# Patient Record
Sex: FEMALE | Race: WHITE
[De-identification: names, ages, dates, MRNs, and addresses within clinical notes are randomized per-mention and may not be internally consistent; named-entity substitution may affect disease eponyms.]

---

## 2018-03-14 ENCOUNTER — HOSPITAL ENCOUNTER (EMERGENCY)
Dept: HOSPITAL 80 - CED | Age: 68
Discharge: HOME | End: 2018-03-14
Payer: COMMERCIAL

## 2018-03-14 VITALS — SYSTOLIC BLOOD PRESSURE: 168 MMHG | DIASTOLIC BLOOD PRESSURE: 98 MMHG | HEART RATE: 85 BPM | OXYGEN SATURATION: 95 %

## 2018-03-14 VITALS — TEMPERATURE: 98.2 F | RESPIRATION RATE: 16 BRPM
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
## 2018-03-14 DIAGNOSIS — Z87.891: ICD-10-CM
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
## 2018-03-14 DIAGNOSIS — Z23: ICD-10-CM

## 2018-03-14 DIAGNOSIS — S81.811A: Primary | ICD-10-CM

## 2018-03-14 DIAGNOSIS — Z79.84: ICD-10-CM
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
## 2018-03-14 DIAGNOSIS — E11.9: ICD-10-CM
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
## 2018-03-14 PROCEDURE — 0HQKXZZ REPAIR RIGHT LOWER LEG SKIN, EXTERNAL APPROACH: ICD-10-PCS

## 2018-03-14 NOTE — EDPHY
H & P


Time Seen by Provider: 03/14/18 12:22


HPI/ROS: 





67-year-old female accidentally stabbed herself with a  to her right 

lower leg.


No numbness or tingling in her leg.  She minute we washed it out after the 

accident.





Review of systems


As per HPI


General no fever no chills no weakness


HEENT no eye pain no eye discharge. No eye redness, no sore throat


Respiratory no cough, no shortness of breath


Cardiac no chest pain, no peripheral edema


GI no abdominal pain, no diarrhea, no constipation, no nausea, no vomiting


  no flank pain, no hematuria, no dysuria


Musculoskeletal no myalgias, no joint pain


Heme  no easy bruising, no easy bleeding


Endo no polyuria, no polydipsia


Skin no rashes, no pruritus


Neuro no syncope, no dizziness, no headaches


Psych is no suicidal ideation, no homicidal ideation





Past Medical/Surgical History: 





Diabetes type 2


Social History: 





No excessive alcohol or drug use


Smoking Status: Former smoker


Physical Exam: 





Alert and oriented in no acute distress nontoxic appearance, afebrile


Atraumatic normocephalic


Neck no JVD


Lungs clear to auscultation, no respiratory distress


Heart regular rate and rhythm


Extremities no cyanosis clubbing edema





Right lower extremity


2 cm laceration at medial aspect of lower leg


No foreign body


No muscle involvement





The pulses intact


Full range of motion of ankle foot digits


Constitutional: 


 Initial Vital Signs











Temperature (C)  36.8 C   03/14/18 12:06


 


Heart Rate  87   03/14/18 12:06


 


Respiratory Rate  16   03/14/18 12:06


 


Blood Pressure  172/100 H  03/14/18 12:06


 


O2 Sat (%)  94   03/14/18 12:06








 











O2 Delivery Mode               Room Air














Allergies/Adverse Reactions: 


 





No Known Allergies Allergy (Unverified 03/14/18 12:12)


 








Home Medications: 














 Medication  Instructions  Recorded


 


Metformin HCl  03/14/18














Medical Decision Making


Procedures: 





Procedure note-laceration


The wound was irrigated with copious amounts of saline.


Lidocaine 1% was used for local anesthetic.


4 simple interrupted sutures were placed.


4-0 Ethilon was used.


Patient tolerated procedure well.


ED Course/Re-evaluation: 





Patient seen and evaluated for laceration to right lower extremity from a box 

cutter.





Impression


Right lower leg laceration





Plan


Sutured care


Return in 10 days for suture removal





- Data Points


Medications Given: 


 








Discontinued Medications





Diphtheria/Tetanus/Acell Pertussis (Boostrix)  0.5 ml IM .ONCE ONE


   Stop: 03/14/18 12:14


   Last Admin: 03/14/18 12:19 Dose:  0.5 ml


Tetracaine/Epinephrine/Lidocaine (Let Gel Topical)  1 ea TP EDNOW ONE


   Stop: 03/14/18 12:14


   Last Admin: 03/14/18 12:18 Dose:  1 ea








Departure





- Departure


Disposition: Home, Routine, Self-Care


Clinical Impression: 


 Laceration of lower leg, right





Condition: Good


Instructions:  Care For Your Stitches (ED), Laceration (ED)


Additional Instructions: 


Return in 10 days for suture removal


Referrals: 


WILMA GREEN [Primary Care Provider] - As per Instructions